# Patient Record
Sex: FEMALE | Race: WHITE | NOT HISPANIC OR LATINO | Employment: UNEMPLOYED | URBAN - NONMETROPOLITAN AREA
[De-identification: names, ages, dates, MRNs, and addresses within clinical notes are randomized per-mention and may not be internally consistent; named-entity substitution may affect disease eponyms.]

---

## 2023-06-13 NOTE — PROGRESS NOTES
Subjective   Patient ID: Tara Charles is a 48 y.o. female who presents for Establish Care (Pt presents today to establish care; would like to discuss ozempic, she is diabetic; ).    HPI     DM: She is on Metformin 2 times daily, she is also on Nesina. States she has [facility] and [facility] to [facility]. There is a contraindication and decreased benefit of Nesina with GLP1 antagonists.   Just started Alogliptin has just started around October -January.   Her A1c was improved with metformin and then she started the alogliptin. [facility].   A1c was checking in May, 7%  Would like to stop alogliptin and trial ozempic.     GERD: Omeprazole. Had EGD before     [facility], [facility]. Buspirone and duloxetine.   Elmethal folate 7.5mg twice daily, she did DNA test for psych meds, this helps absorbe the medication better.     Hyperlipidemia: Atorvastatin, taking half tablet daily     Migraines: Immatrex She did botox, accupunture. Now just taking immatrex 100mg.     All other systems reviewed and negative for complaint unless stated above.    Surgery: ptosis, carpal tunnel x2 on right hand, tonsils.   Family: Aunt and grandmother Breast cancer, DM, Crohns.   smoker: none   Etoh: very rare   Drug use: none     Mammogram: April 2023, She had a biopsy in the past  PAP:  March 2023, yearly, abnormal had colposcopy, non cancerous[facility]  Colonoscopy: Long time ago, [employer] duty. Greater than 10 years.     Review of Systems   Constitutional:  Negative for chills and fever.   HENT:  Negative for congestion, ear pain and rhinorrhea.    Eyes:  Negative for discharge and redness.   Respiratory:  Negative for cough, shortness of breath and wheezing.    Cardiovascular:  Negative for chest pain and leg swelling.   Gastrointestinal:  Negative for abdominal pain, constipation, diarrhea, nausea and vomiting.   Genitourinary:  Negative for difficulty  "urinating, frequency and urgency.   Musculoskeletal:  Negative for gait problem.   Skin:  Negative for rash and wound.   Neurological:  Negative for dizziness, weakness and headaches.   Psychiatric/Behavioral:  Negative for confusion. The patient is not nervous/anxious.        Objective   BP (!) 134/92 (BP Location: Right arm, Patient Position: Sitting, BP Cuff Size: Large adult)   Pulse 88   Ht 1.545 m (5' 0.83\")   Wt 84 kg (185 lb 3.2 oz)   SpO2 97%   BMI 35.19 kg/m²     Physical Exam  Vitals reviewed.   Constitutional:       Appearance: Normal appearance.   HENT:      Head: Normocephalic.      Right Ear: Tympanic membrane, ear canal and external ear normal.      Left Ear: Tympanic membrane, ear canal and external ear normal.      Nose: No rhinorrhea.      Mouth/Throat:      Mouth: Mucous membranes are moist.      Pharynx: Oropharynx is clear.   Eyes:      Pupils: Pupils are equal, round, and reactive to light.   Cardiovascular:      Rate and Rhythm: Normal rate and regular rhythm.      Pulses: Normal pulses.   Pulmonary:      Effort: Pulmonary effort is normal.      Breath sounds: Normal breath sounds.   Abdominal:      General: Abdomen is flat. Bowel sounds are normal.      Palpations: Abdomen is soft.   Musculoskeletal:         General: No tenderness. Normal range of motion.      Right lower leg: No edema.      Left lower leg: No edema.   Lymphadenopathy:      Cervical: No cervical adenopathy.   Skin:     General: Skin is warm and dry.      Findings: No rash.   Neurological:      Mental Status: She is alert and oriented to person, place, and time.   Psychiatric:         Mood and Affect: Mood normal.         Behavior: Behavior normal.       Assessment/Plan       #Migraines   Taking Imatrex     #Hyperlipidemia   Taking atorvastatin     #GERD  On omeprazole     #Anxiety  #depression   #PTSD  [facility]  On Duloxetine, buspirone    #ADHD  Adderall from [facility]    #DMT2   [facility] A1c 7% with [facility]   Stopping " alogliptin   Trial ozempic   Continue metformin   Follow up in 2 months to assess ozempic tolerance     #HCM  PAP and Mammogram UTD   Due for colonoscopy   Will get with [facility]

## 2023-06-16 ENCOUNTER — OFFICE VISIT (OUTPATIENT)
Dept: PRIMARY CARE | Facility: CLINIC | Age: 48
End: 2023-06-16
Payer: COMMERCIAL

## 2023-06-16 VITALS
WEIGHT: 185.2 LBS | HEIGHT: 61 IN | DIASTOLIC BLOOD PRESSURE: 92 MMHG | SYSTOLIC BLOOD PRESSURE: 134 MMHG | OXYGEN SATURATION: 97 % | BODY MASS INDEX: 34.97 KG/M2 | HEART RATE: 88 BPM

## 2023-06-16 DIAGNOSIS — K21.9 GASTROESOPHAGEAL REFLUX DISEASE WITHOUT ESOPHAGITIS: ICD-10-CM

## 2023-06-16 DIAGNOSIS — Z00.00 ANNUAL PHYSICAL EXAM: ICD-10-CM

## 2023-06-16 DIAGNOSIS — E78.49 OTHER HYPERLIPIDEMIA: ICD-10-CM

## 2023-06-16 DIAGNOSIS — E11.9 TYPE 2 DIABETES MELLITUS WITHOUT COMPLICATION, WITHOUT LONG-TERM CURRENT USE OF INSULIN (MULTI): Primary | ICD-10-CM

## 2023-06-16 DIAGNOSIS — G43.809 OTHER MIGRAINE WITHOUT STATUS MIGRAINOSUS, NOT INTRACTABLE: ICD-10-CM

## 2023-06-16 PROCEDURE — 3080F DIAST BP >= 90 MM HG: CPT | Performed by: REGISTERED NURSE

## 2023-06-16 PROCEDURE — 3075F SYST BP GE 130 - 139MM HG: CPT | Performed by: REGISTERED NURSE

## 2023-06-16 PROCEDURE — 99214 OFFICE O/P EST MOD 30 MIN: CPT | Performed by: REGISTERED NURSE

## 2023-06-16 PROCEDURE — 1036F TOBACCO NON-USER: CPT | Performed by: REGISTERED NURSE

## 2023-06-16 RX ORDER — ATORVASTATIN CALCIUM 20 MG/1
1 TABLET, FILM COATED ORAL DAILY
COMMUNITY

## 2023-06-16 RX ORDER — DEXTROAMPHETAMINE SACCHARATE, AMPHETAMINE ASPARTATE, DEXTROAMPHETAMINE SULFATE AND AMPHETAMINE SULFATE 3.75; 3.75; 3.75; 3.75 MG/1; MG/1; MG/1; MG/1
1 TABLET ORAL DAILY
COMMUNITY

## 2023-06-16 RX ORDER — OMEPRAZOLE 20 MG/1
CAPSULE, DELAYED RELEASE ORAL 2 TIMES DAILY
COMMUNITY

## 2023-06-16 RX ORDER — HYDROCODONE BITARTRATE AND ACETAMINOPHEN 5; 325 MG/1; MG/1
1 TABLET ORAL EVERY 6 HOURS PRN
COMMUNITY
End: 2023-06-16 | Stop reason: ALTCHOICE

## 2023-06-16 RX ORDER — BUSPIRONE HYDROCHLORIDE 10 MG/1
5 TABLET ORAL 3 TIMES DAILY
COMMUNITY

## 2023-06-16 RX ORDER — DULOXETIN HYDROCHLORIDE 60 MG/1
CAPSULE, DELAYED RELEASE ORAL
COMMUNITY
Start: 2022-07-26

## 2023-06-16 RX ORDER — ALOGLIPTIN 12.5 MG/1
12.5 TABLET, FILM COATED ORAL DAILY
COMMUNITY
End: 2023-06-16

## 2023-06-16 RX ORDER — METFORMIN HYDROCHLORIDE 500 MG/1
TABLET ORAL 2 TIMES DAILY
COMMUNITY

## 2023-06-16 RX ORDER — VENLAFAXINE 75 MG/1
150 TABLET ORAL
COMMUNITY
End: 2023-06-16

## 2023-06-16 RX ORDER — TRAZODONE HYDROCHLORIDE 100 MG/1
100 TABLET ORAL
COMMUNITY
End: 2023-06-16 | Stop reason: SINTOL

## 2023-06-16 RX ORDER — LORATADINE 10 MG/1
TABLET ORAL
COMMUNITY
Start: 2023-05-24 | End: 2023-06-16 | Stop reason: WASHOUT

## 2023-06-16 ASSESSMENT — ENCOUNTER SYMPTOMS
DIFFICULTY URINATING: 0
ABDOMINAL PAIN: 0
VOMITING: 0
EYE REDNESS: 0
NAUSEA: 0
DIZZINESS: 0
WHEEZING: 0
DIARRHEA: 0
HEADACHES: 0
SHORTNESS OF BREATH: 0
FREQUENCY: 0
WEAKNESS: 0
CONSTIPATION: 0
CHILLS: 0
EYE DISCHARGE: 0
CONFUSION: 0
COUGH: 0
WOUND: 0
NERVOUS/ANXIOUS: 0
FEVER: 0
RHINORRHEA: 0

## 2023-08-14 PROBLEM — R87.611 ATYPICAL SQUAMOUS CELLS CANNOT EXCLUDE HIGH GRADE SQUAMOUS INTRAEPITHELIAL LESION ON CYTOLOGIC SMEAR OF CERVIX (ASC-H): Status: RESOLVED | Noted: 2020-05-14 | Resolved: 2023-08-14

## 2023-08-14 PROBLEM — H90.3 SENSORINEURAL HEARING LOSS (SNHL) OF BOTH EARS: Status: ACTIVE | Noted: 2020-08-06

## 2024-08-30 ENCOUNTER — HOSPITAL ENCOUNTER (OUTPATIENT)
Dept: RADIOLOGY | Facility: HOSPITAL | Age: 49
Discharge: HOME | End: 2024-08-30
Payer: OTHER GOVERNMENT

## 2024-08-30 DIAGNOSIS — M25.531 PAIN IN RIGHT WRIST: ICD-10-CM

## 2024-08-30 PROCEDURE — 2550000001 HC RX 255 CONTRASTS: Performed by: STUDENT IN AN ORGANIZED HEALTH CARE EDUCATION/TRAINING PROGRAM

## 2024-08-30 PROCEDURE — 73222 MRI JOINT UPR EXTREM W/DYE: CPT | Mod: RT

## 2024-08-30 PROCEDURE — A9575 INJ GADOTERATE MEGLUMI 0.1ML: HCPCS | Performed by: STUDENT IN AN ORGANIZED HEALTH CARE EDUCATION/TRAINING PROGRAM

## 2024-08-30 PROCEDURE — 25246 INJECTION FOR WRIST X-RAY: CPT | Mod: RT

## 2024-08-30 RX ORDER — GADOTERATE MEGLUMINE 376.9 MG/ML
0.1 INJECTION INTRAVENOUS
Status: COMPLETED | OUTPATIENT
Start: 2024-08-30 | End: 2024-08-30

## 2024-08-30 RX ORDER — LIDOCAINE HYDROCHLORIDE 10 MG/ML
2 INJECTION, SOLUTION EPIDURAL; INFILTRATION; INTRACAUDAL; PERINEURAL ONCE
Status: DISCONTINUED | OUTPATIENT
Start: 2024-08-30 | End: 2024-08-31 | Stop reason: HOSPADM

## 2024-08-30 NOTE — PRE-PROCEDURE NOTE
Interventional Radiology Preprocedure Note    Indication for procedure: The encounter diagnosis was Pain in right wrist.    Relevant review of systems: NA    Relevant Labs:   Lab Results   Component Value Date    CREATININE 0.58 01/24/2023       Planned Sedation/Anesthesia: Local    Airway assessment: N/A    Directed physical examination:    N/A    Mallampati: N/A    ASA Score: ASA 1 - Normal health patient    Benefits, risks and alternatives of procedure and planned sedation have been discussed with the patient and/or their representative. All questions answered and they agree to proceed.